# Patient Record
Sex: FEMALE | Race: WHITE | NOT HISPANIC OR LATINO | URBAN - METROPOLITAN AREA
[De-identification: names, ages, dates, MRNs, and addresses within clinical notes are randomized per-mention and may not be internally consistent; named-entity substitution may affect disease eponyms.]

---

## 2017-09-05 ENCOUNTER — EMERGENCY (EMERGENCY)
Facility: HOSPITAL | Age: 32
LOS: 1 days | Discharge: HOME | End: 2017-09-05
Attending: EMERGENCY MEDICINE | Admitting: EMERGENCY MEDICINE
Payer: COMMERCIAL

## 2017-09-05 VITALS
OXYGEN SATURATION: 100 % | SYSTOLIC BLOOD PRESSURE: 108 MMHG | HEART RATE: 76 BPM | RESPIRATION RATE: 18 BRPM | TEMPERATURE: 98 F | DIASTOLIC BLOOD PRESSURE: 73 MMHG | WEIGHT: 125 LBS | HEIGHT: 60 IN

## 2017-09-05 PROCEDURE — 99285 EMERGENCY DEPT VISIT HI MDM: CPT | Mod: 25

## 2017-09-06 VITALS
HEART RATE: 79 BPM | OXYGEN SATURATION: 99 % | RESPIRATION RATE: 18 BRPM | SYSTOLIC BLOOD PRESSURE: 91 MMHG | TEMPERATURE: 98 F | DIASTOLIC BLOOD PRESSURE: 58 MMHG

## 2017-09-06 LAB
ALBUMIN SERPL ELPH-MCNC: 4.1 G/DL — SIGNIFICANT CHANGE UP (ref 3.3–5)
ALP SERPL-CCNC: 52 U/L — SIGNIFICANT CHANGE UP (ref 40–120)
ALT FLD-CCNC: 13 U/L RC — SIGNIFICANT CHANGE UP (ref 10–45)
ANION GAP SERPL CALC-SCNC: 11 MMOL/L — SIGNIFICANT CHANGE UP (ref 5–17)
AST SERPL-CCNC: 15 U/L — SIGNIFICANT CHANGE UP (ref 10–40)
BASOPHILS # BLD AUTO: 0 K/UL — SIGNIFICANT CHANGE UP (ref 0–0.2)
BASOPHILS NFR BLD AUTO: 0.1 % — SIGNIFICANT CHANGE UP (ref 0–2)
BILIRUB SERPL-MCNC: 0.2 MG/DL — SIGNIFICANT CHANGE UP (ref 0.2–1.2)
BLD GP AB SCN SERPL QL: NEGATIVE — SIGNIFICANT CHANGE UP
BUN SERPL-MCNC: 11 MG/DL — SIGNIFICANT CHANGE UP (ref 7–23)
CALCIUM SERPL-MCNC: 9.3 MG/DL — SIGNIFICANT CHANGE UP (ref 8.4–10.5)
CHLORIDE SERPL-SCNC: 104 MMOL/L — SIGNIFICANT CHANGE UP (ref 96–108)
CO2 SERPL-SCNC: 24 MMOL/L — SIGNIFICANT CHANGE UP (ref 22–31)
CREAT SERPL-MCNC: 0.53 MG/DL — SIGNIFICANT CHANGE UP (ref 0.5–1.3)
EOSINOPHIL # BLD AUTO: 0 K/UL — SIGNIFICANT CHANGE UP (ref 0–0.5)
EOSINOPHIL NFR BLD AUTO: 0.3 % — SIGNIFICANT CHANGE UP (ref 0–6)
GLUCOSE SERPL-MCNC: 114 MG/DL — HIGH (ref 70–99)
HCG SERPL-ACNC: SIGNIFICANT CHANGE UP MIU/ML
HCT VFR BLD CALC: 35.9 % — SIGNIFICANT CHANGE UP (ref 34.5–45)
HGB BLD-MCNC: 12.4 G/DL — SIGNIFICANT CHANGE UP (ref 11.5–15.5)
LYMPHOCYTES # BLD AUTO: 1.5 K/UL — SIGNIFICANT CHANGE UP (ref 1–3.3)
LYMPHOCYTES # BLD AUTO: 18.9 % — SIGNIFICANT CHANGE UP (ref 13–44)
MCHC RBC-ENTMCNC: 30.5 PG — SIGNIFICANT CHANGE UP (ref 27–34)
MCHC RBC-ENTMCNC: 34.5 GM/DL — SIGNIFICANT CHANGE UP (ref 32–36)
MCV RBC AUTO: 88.4 FL — SIGNIFICANT CHANGE UP (ref 80–100)
MONOCYTES # BLD AUTO: 0.4 K/UL — SIGNIFICANT CHANGE UP (ref 0–0.9)
MONOCYTES NFR BLD AUTO: 5.3 % — SIGNIFICANT CHANGE UP (ref 2–14)
NEUTROPHILS # BLD AUTO: 6.1 K/UL — SIGNIFICANT CHANGE UP (ref 1.8–7.4)
NEUTROPHILS NFR BLD AUTO: 75.5 % — SIGNIFICANT CHANGE UP (ref 43–77)
PLATELET # BLD AUTO: 196 K/UL — SIGNIFICANT CHANGE UP (ref 150–400)
POTASSIUM SERPL-MCNC: 4 MMOL/L — SIGNIFICANT CHANGE UP (ref 3.5–5.3)
POTASSIUM SERPL-SCNC: 4 MMOL/L — SIGNIFICANT CHANGE UP (ref 3.5–5.3)
PROT SERPL-MCNC: 6.9 G/DL — SIGNIFICANT CHANGE UP (ref 6–8.3)
RBC # BLD: 4.05 M/UL — SIGNIFICANT CHANGE UP (ref 3.8–5.2)
RBC # FLD: 11 % — SIGNIFICANT CHANGE UP (ref 10.3–14.5)
RH IG SCN BLD-IMP: POSITIVE — SIGNIFICANT CHANGE UP
SODIUM SERPL-SCNC: 139 MMOL/L — SIGNIFICANT CHANGE UP (ref 135–145)
WBC # BLD: 8.1 K/UL — SIGNIFICANT CHANGE UP (ref 3.8–10.5)
WBC # FLD AUTO: 8.1 K/UL — SIGNIFICANT CHANGE UP (ref 3.8–10.5)

## 2017-09-06 PROCEDURE — 86850 RBC ANTIBODY SCREEN: CPT

## 2017-09-06 PROCEDURE — 76817 TRANSVAGINAL US OBSTETRIC: CPT

## 2017-09-06 PROCEDURE — 86900 BLOOD TYPING SEROLOGIC ABO: CPT

## 2017-09-06 PROCEDURE — 86901 BLOOD TYPING SEROLOGIC RH(D): CPT

## 2017-09-06 PROCEDURE — 84702 CHORIONIC GONADOTROPIN TEST: CPT

## 2017-09-06 PROCEDURE — 85027 COMPLETE CBC AUTOMATED: CPT

## 2017-09-06 PROCEDURE — 76817 TRANSVAGINAL US OBSTETRIC: CPT | Mod: 26

## 2017-09-06 PROCEDURE — 80053 COMPREHEN METABOLIC PANEL: CPT

## 2017-09-06 PROCEDURE — 99284 EMERGENCY DEPT VISIT MOD MDM: CPT | Mod: 25

## 2017-09-06 NOTE — ED PROVIDER NOTE - ATTENDING CONTRIBUTION TO CARE
Patient with + pregnancy test and no confirmed IUP. Patient with cramping and lower abdominal pain today. 1 hour duration. patient denies vaginal bleeding or discharge  gu by resident with no masses, no cmt, no pallor, soft, ntnd, no rebound/guarding, non-tachycardic, non-tachypneic, no pallor, normal conjunctiva  will get iv, labs, ultrasound abdomen, UA ordered but patient does not wish to wait for result and reassess  noted subchorionic hemorrhage but patient rH +   patient to see ob/gyn today and will give ua  No immediate life threatening issues present on history or clinical exam. Patient is a safe disposition home, has capacity and insight into their condition, ambulatory in the emergency department and will follow up with their doctor(s) this week. Patient and family  understand anticipatory guidance were given strict return and follow up precautions.  The patient and family have been informed of all concerning signs and symptoms to return to Emergency Department, the necessity to follow up with PMD/Clinic/follow up provided within 2-3 days was explained, and the patient and/or family reports understanding of above with capacity and insight.

## 2017-09-06 NOTE — ED PROVIDER NOTE - OBJECTIVE STATEMENT
32 F , LMP 17, pos pregnancy test at home, no confirmed IUP, p/w lower abd cramping today lasting about one hour. No vaginal bleeding or discharge. had one episode of diarrhea in ED and now cramping resolved. No fevers/chills. has appmt with gyn later today. No dysuria.

## 2017-09-06 NOTE — ED PROVIDER NOTE - CARE PLAN
Principal Discharge DX:	Pregnancy Principal Discharge DX:	Threatened   Secondary Diagnosis:	Subchorionic bleed

## 2017-09-06 NOTE — ED ADULT NURSE NOTE - OBJECTIVE STATEMENT
31 yo female presents to the ED from home, 6 weeks pregnant, her 4th pregnancy with 3 current live births, c/o bilateral lower abdominal pain 5/10 dull in nature with intermittent periods of sharp pain, non-radiating since 2300 last night. patient states she has been vomiting x3 days, last episode this AM and 1 episode of diarrhea tonight in the ED. patient states she started a new antinausea medication today. patient is AAOx4. lung sounds clear bilaterally. cap refill <3sec. patient abdomen is soft, tender to touch lower abdomen, non-distended. 31 yo female presents to the ED from home, 6 weeks pregnant, her 4th pregnancy with 3 current live births, c/o bilateral lower abdominal pain 5/10 dull in nature with intermittent periods of sharp pain, non-radiating since 2300 last night. patient states she has been vomiting x3 days, last episode this AM and 1 episode of diarrhea tonight in the ED. patient states she started a new antinausea medication today. patient is AAOx4. lung sounds clear bilaterally. cap refill <3sec. patient abdomen is soft, tender to touch lower abdomen, non-distended. patient denies vaginal spotting, fevers, chills, HA, dizziness, chest pain, SOB, cough. VSS. MD aware.

## 2018-03-14 ENCOUNTER — OUTPATIENT (OUTPATIENT)
Dept: OUTPATIENT SERVICES | Facility: HOSPITAL | Age: 33
LOS: 1 days | End: 2018-03-14
Payer: COMMERCIAL

## 2018-03-14 DIAGNOSIS — O26.899 OTHER SPECIFIED PREGNANCY RELATED CONDITIONS, UNSPECIFIED TRIMESTER: ICD-10-CM

## 2018-03-14 DIAGNOSIS — Z3A.00 WEEKS OF GESTATION OF PREGNANCY NOT SPECIFIED: ICD-10-CM

## 2018-03-14 PROCEDURE — G0463: CPT

## 2018-03-14 NOTE — CONSULT NOTE ADULT - SUBJECTIVE AND OBJECTIVE BOX
Dear Dr. Mireles    I had the pleasure of seeing Ms. Barbosa in consultation today.  As you know, she is a  at 33 weeks gestation.  EDC . She presents today with a chief complaint of paresthesia of BUE during previous epidural anesthesia in .   PSH of L5-S1 fusion in , since then pt had 2  with epidural anesthesia, first delivery was uneventful, however during second delivery, pt c/o BUE paresthesia during labor which resolved quickly after delivery.                                              .      Past Medical History: low back pain    Past Surgical History: L5-S1 fusion     Family History of Anesthetic Problems: denies    Medications: prenatal MVI, iron supplements    Allergies: NKDA    Physical Examination:  Height:   5feet        Weight:    162 pounds          Airway: Mallampati class II    Impression:    Dr. Cliff Obando evaluated pt and provided education in regard to anesthesia.   It was explained that given her back surgery there is a possibility that placement of the neuraxial block may be more challenging and that even successful placement may be associated with uneven analgesia given likely postsurgical scarring.  That being said, she is an excellent candidate for neuraxial anesthesia. The patient understands the risk of back pain postpartum regardless of the anesthetic chosen.  Risk factors for both general and regional anesthetics were discussed with patient.      Thank you for the courtesy of this consultation. The final anesthetic plan is as per the on call team. Dear Dr. Mireles    I had the pleasure of seeing Ms. Barbosa in consultation today.  As you know, she is a  at 33 weeks gestation.  EDC . She presents today with a chief complaint of paresthesia of BUE during previous epidural anesthesia in .   PSH of L5-S1 fusion in , since then pt had 2  with epidural anesthesia, first delivery was uneventful, however during second delivery, pt c/o BUE paresthesia during labor which resolved quickly after delivery.                                              .      Past Medical History: low back pain    Past Surgical History: L5-S1 fusion     Family History of Anesthetic Problems: denies    Medications: prenatal MVI, iron supplements    Allergies: NKDA    Physical Examination:  Height:   5feet        Weight:    162 pounds          Airway: Mallampati class II    Impression:    In summary, this patient has had a lower lumbar fusion in the past, but has had multiple deliveries with epidural analgesia since then without complication, except for some paresthesias in the arms. I discussed this issue with her at length and explained that this remains a risk for her but was likely an isolated incident secondary to high spread of the epidural infusion.  I explained that given her back surgery there is a possibility that placement of the neuraxial block may be more challenging and that even successful placement may be associated with uneven analgesia given likely postsurgical scarring.  That being said, she has had more than one successful and effective epidural and thus she is an excellent candidate for neuraxial anesthesia. The patient understands the risk of back pain postpartum regardless of the anesthetic chosen.  Risk factors for both general and regional anesthetics were discussed with patient.      Thank you for the courtesy of this consultation. The final anesthetic plan is as per the on call team.

## 2018-04-16 ENCOUNTER — OUTPATIENT (OUTPATIENT)
Dept: OUTPATIENT SERVICES | Facility: HOSPITAL | Age: 33
LOS: 1 days | End: 2018-04-16
Payer: COMMERCIAL

## 2018-04-16 DIAGNOSIS — O26.899 OTHER SPECIFIED PREGNANCY RELATED CONDITIONS, UNSPECIFIED TRIMESTER: ICD-10-CM

## 2018-04-16 DIAGNOSIS — Z3A.00 WEEKS OF GESTATION OF PREGNANCY NOT SPECIFIED: ICD-10-CM

## 2018-04-16 PROCEDURE — G0463: CPT

## 2018-04-16 PROCEDURE — 59025 FETAL NON-STRESS TEST: CPT

## 2018-04-18 ENCOUNTER — OUTPATIENT (OUTPATIENT)
Dept: OUTPATIENT SERVICES | Facility: HOSPITAL | Age: 33
LOS: 1 days | End: 2018-04-18
Payer: COMMERCIAL

## 2018-04-18 DIAGNOSIS — Z3A.00 WEEKS OF GESTATION OF PREGNANCY NOT SPECIFIED: ICD-10-CM

## 2018-04-18 DIAGNOSIS — O26.899 OTHER SPECIFIED PREGNANCY RELATED CONDITIONS, UNSPECIFIED TRIMESTER: ICD-10-CM

## 2018-04-18 PROCEDURE — 59025 FETAL NON-STRESS TEST: CPT

## 2018-04-18 PROCEDURE — 59025 FETAL NON-STRESS TEST: CPT | Mod: 26

## 2018-04-18 PROCEDURE — G0463: CPT

## 2018-04-24 ENCOUNTER — INPATIENT (INPATIENT)
Facility: HOSPITAL | Age: 33
LOS: 1 days | Discharge: HOME | End: 2018-04-26
Attending: OBSTETRICS & GYNECOLOGY | Admitting: OBSTETRICS & GYNECOLOGY
Payer: COMMERCIAL

## 2018-04-24 VITALS — HEIGHT: 60 IN | WEIGHT: 163.14 LBS

## 2018-04-24 DIAGNOSIS — Z33.1 PREGNANT STATE, INCIDENTAL: ICD-10-CM

## 2018-04-24 LAB
BASOPHILS # BLD AUTO: 0.1 K/UL — SIGNIFICANT CHANGE UP (ref 0–0.2)
BASOPHILS NFR BLD AUTO: 0.6 % — SIGNIFICANT CHANGE UP (ref 0–2)
BLD GP AB SCN SERPL QL: NEGATIVE — SIGNIFICANT CHANGE UP
EOSINOPHIL # BLD AUTO: 0 K/UL — SIGNIFICANT CHANGE UP (ref 0–0.5)
EOSINOPHIL NFR BLD AUTO: 0.4 % — SIGNIFICANT CHANGE UP (ref 0–6)
HCT VFR BLD CALC: 35.2 % — SIGNIFICANT CHANGE UP (ref 34.5–45)
HGB BLD-MCNC: 12.1 G/DL — SIGNIFICANT CHANGE UP (ref 11.5–15.5)
LYMPHOCYTES # BLD AUTO: 2.5 K/UL — SIGNIFICANT CHANGE UP (ref 1–3.3)
LYMPHOCYTES # BLD AUTO: 28.2 % — SIGNIFICANT CHANGE UP (ref 13–44)
MCHC RBC-ENTMCNC: 29.8 PG — SIGNIFICANT CHANGE UP (ref 27–34)
MCHC RBC-ENTMCNC: 34.5 GM/DL — SIGNIFICANT CHANGE UP (ref 32–36)
MCV RBC AUTO: 86.2 FL — SIGNIFICANT CHANGE UP (ref 80–100)
MONOCYTES # BLD AUTO: 0.6 K/UL — SIGNIFICANT CHANGE UP (ref 0–0.9)
MONOCYTES NFR BLD AUTO: 7.4 % — SIGNIFICANT CHANGE UP (ref 2–14)
NEUTROPHILS # BLD AUTO: 5.5 K/UL — SIGNIFICANT CHANGE UP (ref 1.8–7.4)
NEUTROPHILS NFR BLD AUTO: 63.4 % — SIGNIFICANT CHANGE UP (ref 43–77)
PLATELET # BLD AUTO: 220 K/UL — SIGNIFICANT CHANGE UP (ref 150–400)
RBC # BLD: 4.08 M/UL — SIGNIFICANT CHANGE UP (ref 3.8–5.2)
RBC # FLD: 15 % — HIGH (ref 10.3–14.5)
RH IG SCN BLD-IMP: POSITIVE — SIGNIFICANT CHANGE UP
T PALLIDUM AB TITR SER: NEGATIVE — SIGNIFICANT CHANGE UP
WBC # BLD: 8.7 K/UL — SIGNIFICANT CHANGE UP (ref 3.8–10.5)
WBC # FLD AUTO: 8.7 K/UL — SIGNIFICANT CHANGE UP (ref 3.8–10.5)

## 2018-04-24 RX ORDER — IBUPROFEN 200 MG
600 TABLET ORAL EVERY 6 HOURS
Qty: 0 | Refills: 0 | Status: COMPLETED | OUTPATIENT
Start: 2018-04-24 | End: 2019-03-23

## 2018-04-24 RX ORDER — CITRIC ACID/SODIUM CITRATE 300-500 MG
15 SOLUTION, ORAL ORAL EVERY 4 HOURS
Qty: 0 | Refills: 0 | Status: DISCONTINUED | OUTPATIENT
Start: 2018-04-24 | End: 2018-04-24

## 2018-04-24 RX ORDER — OXYCODONE HYDROCHLORIDE 5 MG/1
5 TABLET ORAL
Qty: 0 | Refills: 0 | Status: DISCONTINUED | OUTPATIENT
Start: 2018-04-24 | End: 2018-04-26

## 2018-04-24 RX ORDER — LANOLIN
1 OINTMENT (GRAM) TOPICAL EVERY 6 HOURS
Qty: 0 | Refills: 0 | Status: DISCONTINUED | OUTPATIENT
Start: 2018-04-24 | End: 2018-04-26

## 2018-04-24 RX ORDER — SODIUM CHLORIDE 9 MG/ML
1000 INJECTION, SOLUTION INTRAVENOUS
Qty: 0 | Refills: 0 | Status: DISCONTINUED | OUTPATIENT
Start: 2018-04-24 | End: 2018-04-24

## 2018-04-24 RX ORDER — HYDROCORTISONE 1 %
1 OINTMENT (GRAM) TOPICAL EVERY 4 HOURS
Qty: 0 | Refills: 0 | Status: DISCONTINUED | OUTPATIENT
Start: 2018-04-24 | End: 2018-04-26

## 2018-04-24 RX ORDER — KETOROLAC TROMETHAMINE 30 MG/ML
30 SYRINGE (ML) INJECTION ONCE
Qty: 0 | Refills: 0 | Status: DISCONTINUED | OUTPATIENT
Start: 2018-04-24 | End: 2018-04-24

## 2018-04-24 RX ORDER — OXYCODONE HYDROCHLORIDE 5 MG/1
5 TABLET ORAL EVERY 4 HOURS
Qty: 0 | Refills: 0 | Status: DISCONTINUED | OUTPATIENT
Start: 2018-04-24 | End: 2018-04-26

## 2018-04-24 RX ORDER — SIMETHICONE 80 MG/1
80 TABLET, CHEWABLE ORAL EVERY 6 HOURS
Qty: 0 | Refills: 0 | Status: DISCONTINUED | OUTPATIENT
Start: 2018-04-24 | End: 2018-04-26

## 2018-04-24 RX ORDER — AER TRAVELER 0.5 G/1
1 SOLUTION RECTAL; TOPICAL EVERY 4 HOURS
Qty: 0 | Refills: 0 | Status: DISCONTINUED | OUTPATIENT
Start: 2018-04-24 | End: 2018-04-24

## 2018-04-24 RX ORDER — OXYTOCIN 10 UNIT/ML
41.67 VIAL (ML) INJECTION
Qty: 20 | Refills: 0 | Status: DISCONTINUED | OUTPATIENT
Start: 2018-04-24 | End: 2018-04-24

## 2018-04-24 RX ORDER — PRAMOXINE HYDROCHLORIDE 150 MG/15G
1 AEROSOL, FOAM RECTAL EVERY 4 HOURS
Qty: 0 | Refills: 0 | Status: DISCONTINUED | OUTPATIENT
Start: 2018-04-24 | End: 2018-04-26

## 2018-04-24 RX ORDER — DIBUCAINE 1 %
1 OINTMENT (GRAM) RECTAL EVERY 4 HOURS
Qty: 0 | Refills: 0 | Status: DISCONTINUED | OUTPATIENT
Start: 2018-04-24 | End: 2018-04-26

## 2018-04-24 RX ORDER — DOCUSATE SODIUM 100 MG
100 CAPSULE ORAL
Qty: 0 | Refills: 0 | Status: DISCONTINUED | OUTPATIENT
Start: 2018-04-24 | End: 2018-04-26

## 2018-04-24 RX ORDER — OXYTOCIN 10 UNIT/ML
41.67 VIAL (ML) INJECTION
Qty: 20 | Refills: 0 | Status: DISCONTINUED | OUTPATIENT
Start: 2018-04-24 | End: 2018-04-26

## 2018-04-24 RX ORDER — OXYTOCIN 10 UNIT/ML
4 VIAL (ML) INJECTION
Qty: 30 | Refills: 0 | Status: DISCONTINUED | OUTPATIENT
Start: 2018-04-24 | End: 2018-04-24

## 2018-04-24 RX ORDER — PRAMOXINE HYDROCHLORIDE 150 MG/15G
1 AEROSOL, FOAM RECTAL EVERY 4 HOURS
Qty: 0 | Refills: 0 | Status: DISCONTINUED | OUTPATIENT
Start: 2018-04-24 | End: 2018-04-24

## 2018-04-24 RX ORDER — ACETAMINOPHEN 500 MG
975 TABLET ORAL EVERY 6 HOURS
Qty: 0 | Refills: 0 | Status: DISCONTINUED | OUTPATIENT
Start: 2018-04-24 | End: 2018-04-26

## 2018-04-24 RX ORDER — TETANUS TOXOID, REDUCED DIPHTHERIA TOXOID AND ACELLULAR PERTUSSIS VACCINE, ADSORBED 5; 2.5; 8; 8; 2.5 [IU]/.5ML; [IU]/.5ML; UG/.5ML; UG/.5ML; UG/.5ML
0.5 SUSPENSION INTRAMUSCULAR ONCE
Qty: 0 | Refills: 0 | Status: DISCONTINUED | OUTPATIENT
Start: 2018-04-24 | End: 2018-04-26

## 2018-04-24 RX ORDER — OXYTOCIN 10 UNIT/ML
333.33 VIAL (ML) INJECTION
Qty: 20 | Refills: 0 | Status: DISCONTINUED | OUTPATIENT
Start: 2018-04-24 | End: 2018-04-26

## 2018-04-24 RX ORDER — SODIUM CHLORIDE 9 MG/ML
3 INJECTION INTRAMUSCULAR; INTRAVENOUS; SUBCUTANEOUS EVERY 8 HOURS
Qty: 0 | Refills: 0 | Status: DISCONTINUED | OUTPATIENT
Start: 2018-04-24 | End: 2018-04-26

## 2018-04-24 RX ORDER — SODIUM CHLORIDE 9 MG/ML
1000 INJECTION, SOLUTION INTRAVENOUS ONCE
Qty: 0 | Refills: 0 | Status: COMPLETED | OUTPATIENT
Start: 2018-04-24 | End: 2018-04-24

## 2018-04-24 RX ORDER — DIBUCAINE 1 %
1 OINTMENT (GRAM) RECTAL EVERY 4 HOURS
Qty: 0 | Refills: 0 | Status: DISCONTINUED | OUTPATIENT
Start: 2018-04-24 | End: 2018-04-24

## 2018-04-24 RX ORDER — DIPHENHYDRAMINE HCL 50 MG
25 CAPSULE ORAL EVERY 6 HOURS
Qty: 0 | Refills: 0 | Status: DISCONTINUED | OUTPATIENT
Start: 2018-04-24 | End: 2018-04-26

## 2018-04-24 RX ORDER — HYDROCORTISONE 1 %
1 OINTMENT (GRAM) TOPICAL EVERY 4 HOURS
Qty: 0 | Refills: 0 | Status: DISCONTINUED | OUTPATIENT
Start: 2018-04-24 | End: 2018-04-24

## 2018-04-24 RX ORDER — GLYCERIN ADULT
1 SUPPOSITORY, RECTAL RECTAL AT BEDTIME
Qty: 0 | Refills: 0 | Status: DISCONTINUED | OUTPATIENT
Start: 2018-04-24 | End: 2018-04-26

## 2018-04-24 RX ORDER — AER TRAVELER 0.5 G/1
1 SOLUTION RECTAL; TOPICAL EVERY 4 HOURS
Qty: 0 | Refills: 0 | Status: DISCONTINUED | OUTPATIENT
Start: 2018-04-24 | End: 2018-04-26

## 2018-04-24 RX ORDER — IBUPROFEN 200 MG
600 TABLET ORAL EVERY 6 HOURS
Qty: 0 | Refills: 0 | Status: DISCONTINUED | OUTPATIENT
Start: 2018-04-24 | End: 2018-04-26

## 2018-04-24 RX ORDER — SODIUM CHLORIDE 9 MG/ML
3 INJECTION INTRAMUSCULAR; INTRAVENOUS; SUBCUTANEOUS EVERY 8 HOURS
Qty: 0 | Refills: 0 | Status: DISCONTINUED | OUTPATIENT
Start: 2018-04-24 | End: 2018-04-24

## 2018-04-24 RX ORDER — MAGNESIUM HYDROXIDE 400 MG/1
30 TABLET, CHEWABLE ORAL
Qty: 0 | Refills: 0 | Status: DISCONTINUED | OUTPATIENT
Start: 2018-04-24 | End: 2018-04-26

## 2018-04-24 RX ORDER — ACETAMINOPHEN 500 MG
975 TABLET ORAL EVERY 6 HOURS
Qty: 0 | Refills: 0 | Status: COMPLETED | OUTPATIENT
Start: 2018-04-24 | End: 2019-03-23

## 2018-04-24 RX ORDER — OXYTOCIN 10 UNIT/ML
333.33 VIAL (ML) INJECTION
Qty: 20 | Refills: 0 | Status: COMPLETED | OUTPATIENT
Start: 2018-04-24

## 2018-04-24 RX ADMIN — Medication 30 MILLIGRAM(S): at 14:28

## 2018-04-24 RX ADMIN — Medication 4 MILLIUNIT(S)/MIN: at 09:37

## 2018-04-24 RX ADMIN — SODIUM CHLORIDE 2000 MILLILITER(S): 9 INJECTION, SOLUTION INTRAVENOUS at 05:50

## 2018-04-24 RX ADMIN — Medication 1000 MILLIUNIT(S)/MIN: at 14:12

## 2018-04-24 RX ADMIN — Medication 600 MILLIGRAM(S): at 21:10

## 2018-04-24 RX ADMIN — Medication 600 MILLIGRAM(S): at 20:41

## 2018-04-24 RX ADMIN — Medication 100 MILLIGRAM(S): at 20:42

## 2018-04-24 RX ADMIN — Medication 4 MILLIUNIT(S)/MIN: at 06:21

## 2018-04-24 RX ADMIN — SODIUM CHLORIDE 250 MILLILITER(S): 9 INJECTION, SOLUTION INTRAVENOUS at 06:21

## 2018-04-25 LAB
HCT VFR BLD CALC: 31.4 % — LOW (ref 34.5–45)
HGB BLD-MCNC: 9.9 G/DL — LOW (ref 11.5–15.5)

## 2018-04-25 RX ADMIN — Medication 975 MILLIGRAM(S): at 15:33

## 2018-04-25 RX ADMIN — Medication 600 MILLIGRAM(S): at 13:40

## 2018-04-25 RX ADMIN — Medication 600 MILLIGRAM(S): at 18:40

## 2018-04-25 RX ADMIN — Medication 975 MILLIGRAM(S): at 16:11

## 2018-04-25 RX ADMIN — Medication 975 MILLIGRAM(S): at 21:58

## 2018-04-25 RX ADMIN — Medication 600 MILLIGRAM(S): at 03:45

## 2018-04-25 RX ADMIN — Medication 975 MILLIGRAM(S): at 21:06

## 2018-04-25 RX ADMIN — Medication 975 MILLIGRAM(S): at 09:30

## 2018-04-25 RX ADMIN — Medication 975 MILLIGRAM(S): at 08:54

## 2018-04-25 RX ADMIN — Medication 600 MILLIGRAM(S): at 13:10

## 2018-04-25 RX ADMIN — Medication 1 TABLET(S): at 13:10

## 2018-04-25 RX ADMIN — Medication 600 MILLIGRAM(S): at 03:13

## 2018-04-25 NOTE — PROGRESS NOTE ADULT - PROBLEM SELECTOR PLAN 1
Increase OOB  Regular diet  PO Pain protocol  AM H&H  Routine Postpartum Care  Warm packs to epidural site, Motrin

## 2018-04-25 NOTE — PROGRESS NOTE ADULT - SUBJECTIVE AND OBJECTIVE BOX
PA Postpartum Note- PPD#1    Prenatal Labs  Blood type: A Positive  Rubella IgG: IMMune  RPR: Negative      S:Patient w/o complaints, pain is controlled.  Pt c/o tenderness at epidural site. Pt states she has a h/o back surgery and required multiple attempts to place epidural.  Pt is OOB, tolerating PO, voiding. Lochia WNL.     O:  Vital Signs Last 24 Hrs  T(C): 36.8 (25 Apr 2018 05:00), Max: 37 (24 Apr 2018 13:45)  T(F): 98.3 (25 Apr 2018 05:00), Max: 98.6 (24 Apr 2018 13:45)  HR: 80 (25 Apr 2018 05:00) (62 - 98)  BP: 93/61 (25 Apr 2018 05:00) (90/53 - 108/-)  RR: 18 (25 Apr 2018 05:00) (16 - 18)  SpO2: 98% (24 Apr 2018 17:00) (96% - 100%)     Gen: NAD  Abdomen: Soft, nontender, non-distended, fundus firm.  Back: mild ecchymosis at epidural site. Neg warmth, Neg edema Neg errythema  Vaginal: Lochia WNL,    Ext: Neg edema, Neg calf tenderness    LABS:    Hemoglobin: 12.1 g/dL (04-24 @ 06:06)      Hematocrit: 35.2 % (04-24 @ 06:06)      PMHx: s/p spinal fusion, MVP  Current Issues:  tenderness at epidural site

## 2018-04-26 ENCOUNTER — TRANSCRIPTION ENCOUNTER (OUTPATIENT)
Age: 33
End: 2018-04-26

## 2018-04-26 VITALS
SYSTOLIC BLOOD PRESSURE: 93 MMHG | DIASTOLIC BLOOD PRESSURE: 63 MMHG | HEART RATE: 70 BPM | TEMPERATURE: 98 F | RESPIRATION RATE: 18 BRPM

## 2018-04-26 PROCEDURE — 86780 TREPONEMA PALLIDUM: CPT

## 2018-04-26 PROCEDURE — 86901 BLOOD TYPING SEROLOGIC RH(D): CPT

## 2018-04-26 PROCEDURE — 86850 RBC ANTIBODY SCREEN: CPT

## 2018-04-26 PROCEDURE — 85018 HEMOGLOBIN: CPT

## 2018-04-26 PROCEDURE — 59025 FETAL NON-STRESS TEST: CPT

## 2018-04-26 PROCEDURE — 59050 FETAL MONITOR W/REPORT: CPT

## 2018-04-26 PROCEDURE — 85014 HEMATOCRIT: CPT

## 2018-04-26 PROCEDURE — 86900 BLOOD TYPING SEROLOGIC ABO: CPT

## 2018-04-26 PROCEDURE — 85027 COMPLETE CBC AUTOMATED: CPT

## 2018-04-26 RX ADMIN — Medication 975 MILLIGRAM(S): at 08:00

## 2018-04-26 RX ADMIN — Medication 600 MILLIGRAM(S): at 02:21

## 2018-04-26 RX ADMIN — Medication 1 TABLET(S): at 10:21

## 2018-04-26 RX ADMIN — Medication 600 MILLIGRAM(S): at 11:00

## 2018-04-26 RX ADMIN — Medication 600 MILLIGRAM(S): at 03:15

## 2018-04-26 RX ADMIN — Medication 600 MILLIGRAM(S): at 10:21

## 2018-04-26 RX ADMIN — Medication 975 MILLIGRAM(S): at 08:45

## 2018-04-26 NOTE — PROGRESS NOTE ADULT - SUBJECTIVE AND OBJECTIVE BOX
S: Patient doing well. No complaints. Minimal lochia. Pain controlled.    O: Vital Signs Last 24 Hrs  T(C): 36.9 (2018 05:00), Max: 36.9 (2018 05:00)  T(F): 98.4 (2018 05:00), Max: 98.4 (2018 05:00)  HR: 70 (2018 05:00) (70 - 72)  BP: 93/63 (2018 05:00) (93/63 - 109/74)  BP(mean): --  RR: 18 (2018 05:00) (18 - 18)  SpO2: --    Gen: NAD  Abd: soft, Nontender, Nondistended, fundus firm  Ext: no tendern, mild edema    Labs:                        9.9    x     )-----------( x        ( 2018 07:44 )             31.4       A: 32y PPD#2 s/p  doing well.    Plan:  Routine postpartum care  Encouraged out of bed  Regular diet    Discharge  ADY Mireles MD

## 2018-04-26 NOTE — DISCHARGE NOTE OB - PATIENT PORTAL LINK FT
You can access the [facility] Patient Portal, offered by [facility], by registering with the following website: [email or web address]

## 2018-04-26 NOTE — DISCHARGE NOTE OB - CARE PROVIDER_API CALL
Tavo Mireles), Obstetrics and Gynecology  [address] [place] [id number]  Phone: [phone]  Fax: [phone]

## 2019-03-27 ENCOUNTER — APPOINTMENT (OUTPATIENT)
Dept: OPHTHALMOLOGY | Facility: CLINIC | Age: 34
End: 2019-03-27
Payer: COMMERCIAL

## 2019-03-27 DIAGNOSIS — H49.21 SIXTH [ABDUCENT] NERVE PALSY, RIGHT EYE: ICD-10-CM

## 2019-03-27 PROCEDURE — 99204 OFFICE O/P NEW MOD 45 MIN: CPT

## 2019-03-27 PROCEDURE — 92060 SENSORIMOTOR EXAMINATION: CPT

## 2019-04-01 ENCOUNTER — APPOINTMENT (OUTPATIENT)
Dept: RADIOLOGY | Facility: HOSPITAL | Age: 34
End: 2019-04-01
Payer: COMMERCIAL

## 2019-04-01 ENCOUNTER — OUTPATIENT (OUTPATIENT)
Dept: OUTPATIENT SERVICES | Facility: HOSPITAL | Age: 34
LOS: 1 days | End: 2019-04-01
Payer: COMMERCIAL

## 2019-04-01 DIAGNOSIS — H53.2 DIPLOPIA: ICD-10-CM

## 2019-04-01 LAB
APPEARANCE CSF: CLEAR — SIGNIFICANT CHANGE UP
COLOR CSF: SIGNIFICANT CHANGE UP
GLUCOSE CSF-MCNC: 65 MG/DL — SIGNIFICANT CHANGE UP (ref 40–70)
NEUTROPHILS # CSF: SIGNIFICANT CHANGE UP (ref 0–6)
NRBC NFR CSF: <1 — SIGNIFICANT CHANGE UP (ref 0–5)
PROT CSF-MCNC: 19 MG/DL — SIGNIFICANT CHANGE UP (ref 15–45)
RBC # CSF: 1 /UL — HIGH (ref 0–0)
TUBE TYPE: SIGNIFICANT CHANGE UP

## 2019-04-01 PROCEDURE — 87252 VIRUS INOCULATION TISSUE: CPT

## 2019-04-01 PROCEDURE — 62270 DX LMBR SPI PNXR: CPT

## 2019-04-01 PROCEDURE — 84157 ASSAY OF PROTEIN OTHER: CPT

## 2019-04-01 PROCEDURE — 82945 GLUCOSE OTHER FLUID: CPT

## 2019-04-01 PROCEDURE — 77003 FLUOROGUIDE FOR SPINE INJECT: CPT

## 2019-04-01 PROCEDURE — 89051 BODY FLUID CELL COUNT: CPT

## 2019-04-01 PROCEDURE — 86617 LYME DISEASE ANTIBODY: CPT

## 2019-04-01 PROCEDURE — 77003 FLUOROGUIDE FOR SPINE INJECT: CPT | Mod: 26

## 2019-04-05 ENCOUNTER — OUTPATIENT (OUTPATIENT)
Dept: OUTPATIENT SERVICES | Facility: HOSPITAL | Age: 34
LOS: 1 days | End: 2019-04-05
Payer: COMMERCIAL

## 2019-04-05 DIAGNOSIS — Z00.00 ENCOUNTER FOR GENERAL ADULT MEDICAL EXAMINATION WITHOUT ABNORMAL FINDINGS: ICD-10-CM

## 2019-04-05 PROCEDURE — 77003 FLUOROGUIDE FOR SPINE INJECT: CPT | Mod: 26

## 2019-04-05 PROCEDURE — 62273 INJECT EPIDURAL PATCH: CPT

## 2019-04-05 PROCEDURE — 77002 NEEDLE LOCALIZATION BY XRAY: CPT

## 2019-04-05 PROCEDURE — 77003 FLUOROGUIDE FOR SPINE INJECT: CPT

## 2019-04-10 LAB — B BURGDOR AB CSF-ACNC: SIGNIFICANT CHANGE UP

## 2019-04-29 ENCOUNTER — APPOINTMENT (OUTPATIENT)
Dept: OPHTHALMOLOGY | Facility: CLINIC | Age: 34
End: 2019-04-29

## 2020-03-19 ENCOUNTER — TRANSCRIPTION ENCOUNTER (OUTPATIENT)
Age: 35
End: 2020-03-19

## 2022-03-03 ENCOUNTER — APPOINTMENT (OUTPATIENT)
Dept: PAIN MANAGEMENT | Facility: CLINIC | Age: 37
End: 2022-03-03

## 2023-07-18 ENCOUNTER — APPOINTMENT (OUTPATIENT)
Dept: OBGYN | Facility: CLINIC | Age: 38
End: 2023-07-18
Payer: COMMERCIAL

## 2023-07-18 PROCEDURE — 58301 REMOVE INTRAUTERINE DEVICE: CPT

## 2023-09-07 NOTE — PATIENT PROFILE OB - PRO MENTAL HEALTH SX RECENT
Quality 226: Preventive Care And Screening: Tobacco Use: Screening And Cessation Intervention: Patient screened for tobacco use and is an ex/non-smoker Quality 110: Preventive Care And Screening: Influenza Immunization: Influenza Immunization Administered during Influenza season Quality 431: Preventive Care And Screening: Unhealthy Alcohol Use - Screening: Patient not identified as an unhealthy alcohol user when screened for unhealthy alcohol use using a systematic screening method Detail Level: Detailed none

## 2023-09-29 ENCOUNTER — APPOINTMENT (OUTPATIENT)
Dept: OBGYN | Facility: CLINIC | Age: 38
End: 2023-09-29

## 2023-10-06 ENCOUNTER — TRANSCRIPTION ENCOUNTER (OUTPATIENT)
Age: 38
End: 2023-10-06

## 2023-10-06 ENCOUNTER — APPOINTMENT (OUTPATIENT)
Dept: OBGYN | Facility: CLINIC | Age: 38
End: 2023-10-06
Payer: COMMERCIAL

## 2023-10-06 PROCEDURE — 36415 COLL VENOUS BLD VENIPUNCTURE: CPT

## 2023-10-15 ENCOUNTER — TRANSCRIPTION ENCOUNTER (OUTPATIENT)
Age: 38
End: 2023-10-15

## 2023-10-16 ENCOUNTER — TRANSCRIPTION ENCOUNTER (OUTPATIENT)
Age: 38
End: 2023-10-16

## 2023-10-16 ENCOUNTER — APPOINTMENT (OUTPATIENT)
Dept: OBGYN | Facility: CLINIC | Age: 38
End: 2023-10-16
Payer: COMMERCIAL

## 2023-10-16 ENCOUNTER — INPATIENT (INPATIENT)
Facility: HOSPITAL | Age: 38
LOS: 0 days | Discharge: HOME | DRG: 819 | End: 2023-10-17
Attending: OBSTETRICS & GYNECOLOGY | Admitting: OBSTETRICS & GYNECOLOGY
Payer: COMMERCIAL

## 2023-10-16 VITALS
OXYGEN SATURATION: 99 % | WEIGHT: 149.91 LBS | TEMPERATURE: 99 F | SYSTOLIC BLOOD PRESSURE: 99 MMHG | RESPIRATION RATE: 16 BRPM | HEART RATE: 79 BPM | DIASTOLIC BLOOD PRESSURE: 68 MMHG

## 2023-10-16 DIAGNOSIS — O00.90 UNSPECIFIED ECTOPIC PREGNANCY WITHOUT INTRAUTERINE PREGNANCY: ICD-10-CM

## 2023-10-16 LAB
ALBUMIN SERPL ELPH-MCNC: 4.3 G/DL — SIGNIFICANT CHANGE UP (ref 3.3–5)
ALP SERPL-CCNC: 57 U/L — SIGNIFICANT CHANGE UP (ref 40–120)
ALT FLD-CCNC: 19 U/L — SIGNIFICANT CHANGE UP (ref 10–45)
ANION GAP SERPL CALC-SCNC: 9 MMOL/L — SIGNIFICANT CHANGE UP (ref 5–17)
APPEARANCE UR: CLEAR — SIGNIFICANT CHANGE UP
APPEARANCE UR: CLEAR — SIGNIFICANT CHANGE UP
APTT BLD: 29.2 SEC — SIGNIFICANT CHANGE UP (ref 24.5–35.6)
AST SERPL-CCNC: 16 U/L — SIGNIFICANT CHANGE UP (ref 10–40)
BACTERIA # UR AUTO: NEGATIVE — SIGNIFICANT CHANGE UP
BACTERIA # UR AUTO: NEGATIVE — SIGNIFICANT CHANGE UP
BASOPHILS # BLD AUTO: 0.03 K/UL — SIGNIFICANT CHANGE UP (ref 0–0.2)
BASOPHILS NFR BLD AUTO: 0.4 % — SIGNIFICANT CHANGE UP (ref 0–2)
BILIRUB SERPL-MCNC: 0.4 MG/DL — SIGNIFICANT CHANGE UP (ref 0.2–1.2)
BILIRUB UR-MCNC: NEGATIVE — SIGNIFICANT CHANGE UP
BILIRUB UR-MCNC: NEGATIVE — SIGNIFICANT CHANGE UP
BUN SERPL-MCNC: 10 MG/DL — SIGNIFICANT CHANGE UP (ref 7–23)
CALCIUM SERPL-MCNC: 9.4 MG/DL — SIGNIFICANT CHANGE UP (ref 8.4–10.5)
CHLORIDE SERPL-SCNC: 105 MMOL/L — SIGNIFICANT CHANGE UP (ref 96–108)
CO2 SERPL-SCNC: 25 MMOL/L — SIGNIFICANT CHANGE UP (ref 22–31)
COLOR SPEC: SIGNIFICANT CHANGE UP
COLOR SPEC: SIGNIFICANT CHANGE UP
CREAT SERPL-MCNC: 0.59 MG/DL — SIGNIFICANT CHANGE UP (ref 0.5–1.3)
DIFF PNL FLD: ABNORMAL
DIFF PNL FLD: ABNORMAL
EGFR: 118 ML/MIN/1.73M2 — SIGNIFICANT CHANGE UP
EOSINOPHIL # BLD AUTO: 0.01 K/UL — SIGNIFICANT CHANGE UP (ref 0–0.5)
EOSINOPHIL NFR BLD AUTO: 0.1 % — SIGNIFICANT CHANGE UP (ref 0–6)
EPI CELLS # UR: 2 /HPF — SIGNIFICANT CHANGE UP
EPI CELLS # UR: 2 /HPF — SIGNIFICANT CHANGE UP
GLUCOSE SERPL-MCNC: 93 MG/DL — SIGNIFICANT CHANGE UP (ref 70–99)
GLUCOSE UR QL: NEGATIVE — SIGNIFICANT CHANGE UP
GLUCOSE UR QL: NEGATIVE — SIGNIFICANT CHANGE UP
HCG SERPL-ACNC: 359 MIU/ML — HIGH
HCT VFR BLD CALC: 39.6 % — SIGNIFICANT CHANGE UP (ref 34.5–45)
HGB BLD-MCNC: 13.2 G/DL — SIGNIFICANT CHANGE UP (ref 11.5–15.5)
HYALINE CASTS # UR AUTO: 1 /LPF — SIGNIFICANT CHANGE UP (ref 0–2)
HYALINE CASTS # UR AUTO: 1 /LPF — SIGNIFICANT CHANGE UP (ref 0–2)
IMM GRANULOCYTES NFR BLD AUTO: 0.4 % — SIGNIFICANT CHANGE UP (ref 0–0.9)
INR BLD: 1.07 RATIO — SIGNIFICANT CHANGE UP (ref 0.85–1.18)
KETONES UR-MCNC: NEGATIVE — SIGNIFICANT CHANGE UP
KETONES UR-MCNC: NEGATIVE — SIGNIFICANT CHANGE UP
LEUKOCYTE ESTERASE UR-ACNC: NEGATIVE — SIGNIFICANT CHANGE UP
LEUKOCYTE ESTERASE UR-ACNC: NEGATIVE — SIGNIFICANT CHANGE UP
LYMPHOCYTES # BLD AUTO: 1.94 K/UL — SIGNIFICANT CHANGE UP (ref 1–3.3)
LYMPHOCYTES # BLD AUTO: 25.3 % — SIGNIFICANT CHANGE UP (ref 13–44)
MAGNESIUM SERPL-MCNC: 2 MG/DL — SIGNIFICANT CHANGE UP (ref 1.6–2.6)
MCHC RBC-ENTMCNC: 29.4 PG — SIGNIFICANT CHANGE UP (ref 27–34)
MCHC RBC-ENTMCNC: 33.3 GM/DL — SIGNIFICANT CHANGE UP (ref 32–36)
MCV RBC AUTO: 88.2 FL — SIGNIFICANT CHANGE UP (ref 80–100)
MONOCYTES # BLD AUTO: 0.42 K/UL — SIGNIFICANT CHANGE UP (ref 0–0.9)
MONOCYTES NFR BLD AUTO: 5.5 % — SIGNIFICANT CHANGE UP (ref 2–14)
NEUTROPHILS # BLD AUTO: 5.24 K/UL — SIGNIFICANT CHANGE UP (ref 1.8–7.4)
NEUTROPHILS NFR BLD AUTO: 68.3 % — SIGNIFICANT CHANGE UP (ref 43–77)
NITRITE UR-MCNC: NEGATIVE — SIGNIFICANT CHANGE UP
NITRITE UR-MCNC: NEGATIVE — SIGNIFICANT CHANGE UP
NRBC # BLD: 0 /100 WBCS — SIGNIFICANT CHANGE UP (ref 0–0)
PH UR: 8 — SIGNIFICANT CHANGE UP (ref 5–8)
PH UR: 8 — SIGNIFICANT CHANGE UP (ref 5–8)
PHOSPHATE SERPL-MCNC: 3.9 MG/DL — SIGNIFICANT CHANGE UP (ref 2.5–4.5)
PLATELET # BLD AUTO: 256 K/UL — SIGNIFICANT CHANGE UP (ref 150–400)
POTASSIUM SERPL-MCNC: 4.1 MMOL/L — SIGNIFICANT CHANGE UP (ref 3.5–5.3)
POTASSIUM SERPL-SCNC: 4.1 MMOL/L — SIGNIFICANT CHANGE UP (ref 3.5–5.3)
PROT SERPL-MCNC: 6.7 G/DL — SIGNIFICANT CHANGE UP (ref 6–8.3)
PROT UR-MCNC: SIGNIFICANT CHANGE UP
PROT UR-MCNC: SIGNIFICANT CHANGE UP
PROTHROM AB SERPL-ACNC: 11.2 SEC — SIGNIFICANT CHANGE UP (ref 9.5–13)
RBC # BLD: 4.49 M/UL — SIGNIFICANT CHANGE UP (ref 3.8–5.2)
RBC # FLD: 12.1 % — SIGNIFICANT CHANGE UP (ref 10.3–14.5)
RBC CASTS # UR COMP ASSIST: 1 /HPF — SIGNIFICANT CHANGE UP (ref 0–4)
RBC CASTS # UR COMP ASSIST: 1 /HPF — SIGNIFICANT CHANGE UP (ref 0–4)
SODIUM SERPL-SCNC: 139 MMOL/L — SIGNIFICANT CHANGE UP (ref 135–145)
SP GR SPEC: 1.02 — SIGNIFICANT CHANGE UP (ref 1.01–1.02)
SP GR SPEC: 1.02 — SIGNIFICANT CHANGE UP (ref 1.01–1.02)
UROBILINOGEN FLD QL: NEGATIVE — SIGNIFICANT CHANGE UP
UROBILINOGEN FLD QL: NEGATIVE — SIGNIFICANT CHANGE UP
WBC # BLD: 7.67 K/UL — SIGNIFICANT CHANGE UP (ref 3.8–10.5)
WBC # FLD AUTO: 7.67 K/UL — SIGNIFICANT CHANGE UP (ref 3.8–10.5)
WBC UR QL: 1 /HPF — SIGNIFICANT CHANGE UP (ref 0–5)
WBC UR QL: 1 /HPF — SIGNIFICANT CHANGE UP (ref 0–5)

## 2023-10-16 PROCEDURE — 76817 TRANSVAGINAL US OBSTETRIC: CPT | Mod: 26

## 2023-10-16 PROCEDURE — 85610 PROTHROMBIN TIME: CPT

## 2023-10-16 PROCEDURE — 83735 ASSAY OF MAGNESIUM: CPT

## 2023-10-16 PROCEDURE — 88342 IMHCHEM/IMCYTCHM 1ST ANTB: CPT | Mod: 26

## 2023-10-16 PROCEDURE — 99212 OFFICE O/P EST SF 10 MIN: CPT | Mod: 57

## 2023-10-16 PROCEDURE — 87086 URINE CULTURE/COLONY COUNT: CPT

## 2023-10-16 PROCEDURE — 86850 RBC ANTIBODY SCREEN: CPT

## 2023-10-16 PROCEDURE — 80053 COMPREHEN METABOLIC PANEL: CPT

## 2023-10-16 PROCEDURE — 85730 THROMBOPLASTIN TIME PARTIAL: CPT

## 2023-10-16 PROCEDURE — 99285 EMERGENCY DEPT VISIT HI MDM: CPT | Mod: 25

## 2023-10-16 PROCEDURE — 96374 THER/PROPH/DIAG INJ IV PUSH: CPT

## 2023-10-16 PROCEDURE — 59151 TREAT ECTOPIC PREGNANCY: CPT

## 2023-10-16 PROCEDURE — C9399: CPT

## 2023-10-16 PROCEDURE — 84100 ASSAY OF PHOSPHORUS: CPT

## 2023-10-16 PROCEDURE — 99285 EMERGENCY DEPT VISIT HI MDM: CPT

## 2023-10-16 PROCEDURE — 81001 URINALYSIS AUTO W/SCOPE: CPT

## 2023-10-16 PROCEDURE — 76817 TRANSVAGINAL US OBSTETRIC: CPT

## 2023-10-16 PROCEDURE — 88341 IMHCHEM/IMCYTCHM EA ADD ANTB: CPT

## 2023-10-16 PROCEDURE — 88305 TISSUE EXAM BY PATHOLOGIST: CPT

## 2023-10-16 PROCEDURE — 93975 VASCULAR STUDY: CPT | Mod: 26

## 2023-10-16 PROCEDURE — 93975 VASCULAR STUDY: CPT

## 2023-10-16 PROCEDURE — 86900 BLOOD TYPING SEROLOGIC ABO: CPT

## 2023-10-16 PROCEDURE — 84702 CHORIONIC GONADOTROPIN TEST: CPT

## 2023-10-16 PROCEDURE — 86901 BLOOD TYPING SEROLOGIC RH(D): CPT

## 2023-10-16 PROCEDURE — 76830 TRANSVAGINAL US NON-OB: CPT

## 2023-10-16 PROCEDURE — 85025 COMPLETE CBC W/AUTO DIFF WBC: CPT

## 2023-10-16 PROCEDURE — 88305 TISSUE EXAM BY PATHOLOGIST: CPT | Mod: 26

## 2023-10-16 RX ORDER — IBUPROFEN 200 MG
1 TABLET ORAL
Qty: 0 | Refills: 0 | DISCHARGE

## 2023-10-16 RX ORDER — SODIUM CHLORIDE 9 MG/ML
1000 INJECTION, SOLUTION INTRAVENOUS
Refills: 0 | Status: DISCONTINUED | OUTPATIENT
Start: 2023-10-16 | End: 2023-10-17

## 2023-10-16 RX ORDER — ONDANSETRON 8 MG/1
4 TABLET, FILM COATED ORAL ONCE
Refills: 0 | Status: COMPLETED | OUTPATIENT
Start: 2023-10-16 | End: 2023-10-16

## 2023-10-16 RX ORDER — ACETAMINOPHEN 500 MG
1000 TABLET ORAL ONCE
Refills: 0 | Status: COMPLETED | OUTPATIENT
Start: 2023-10-16 | End: 2023-10-16

## 2023-10-16 RX ORDER — ACETAMINOPHEN 500 MG
3 TABLET ORAL
Qty: 0 | Refills: 0 | DISCHARGE

## 2023-10-16 RX ORDER — HYDROMORPHONE HYDROCHLORIDE 2 MG/ML
0.5 INJECTION INTRAMUSCULAR; INTRAVENOUS; SUBCUTANEOUS
Refills: 0 | Status: DISCONTINUED | OUTPATIENT
Start: 2023-10-16 | End: 2023-10-17

## 2023-10-16 RX ORDER — SODIUM CHLORIDE 9 MG/ML
1000 INJECTION INTRAMUSCULAR; INTRAVENOUS; SUBCUTANEOUS ONCE
Refills: 0 | Status: COMPLETED | OUTPATIENT
Start: 2023-10-16 | End: 2023-10-16

## 2023-10-16 RX ADMIN — SODIUM CHLORIDE 125 MILLILITER(S): 9 INJECTION, SOLUTION INTRAVENOUS at 23:11

## 2023-10-16 RX ADMIN — Medication 400 MILLIGRAM(S): at 15:52

## 2023-10-16 RX ADMIN — SODIUM CHLORIDE 1000 MILLILITER(S): 9 INJECTION INTRAMUSCULAR; INTRAVENOUS; SUBCUTANEOUS at 15:51

## 2023-10-16 RX ADMIN — ONDANSETRON 4 MILLIGRAM(S): 8 TABLET, FILM COATED ORAL at 23:11

## 2023-10-16 NOTE — ED PROVIDER NOTE - ATTENDING CONTRIBUTION TO CARE
38-year-old female, , LMP , presents to ED complaining of right lower quadrant abdominal pain since this morning.  Patient notes has been having intermittent vaginal bleeding since . Pt believed she had miscarried. Was being followed by her OB: Dr. Tavo Mireles. HCG initially downtrended from 772 (on ) to 456 (on 10/06), however now has uptrended a bit to 459 (on 10/15). Pt received Ultrasound in office this morning that showed Rt adnexal mass, concerning for ectopic pregnancy. Given this was sent to ED for r/o ectopic. Pt denies f/c, CP, SOB, n/v, urinary sx, weakness/numbness, lightheadedness/dizziness, syncope, or any other symptoms at this time. Pt states currently vaginal spotting is very light.    VSS. Physical exam significant for well-appearing patient in no acute distress.  Heart is regular rate and rhythm.  Lungs clear to auscultation bilaterally.  Abdomen is soft with bilateral lower abdominal tenderness (>RLQ).  No CVA tenderness.  No lower extremity edema.  Pulses are 2+ throughout.  Normal cap refill.  Neuro exam is nonfocal.  Otherwise unremarkable physical exam.    Patient presenting with high suspicion for ectopic pregnancy.  Currently patient is well-appearing with normal vital signs.  Exam significant for right lower quadrant tenderness.  Outpatient ultrasound showing right adnexal mass, concerning for ectopic pregnancy.  Patient states vaginal bleeding is very light at this time.  Plan to check basic labs, hCG, type and screen, UA, and transvaginal ultrasound.  We will consult OB/GYN for evaluation. Dispo pending results and OB/GYN recs.

## 2023-10-16 NOTE — ED ADULT TRIAGE NOTE - CHIEF COMPLAINT QUOTE
Positive pregnancy test in September, pt had bleeding on 9/25 and believed they miscarried, now experiencing lower abdominal pain. OBGYN wanted pt to come in to rule out ectopic, HCG levels rising.

## 2023-10-16 NOTE — ASU DISCHARGE PLAN (ADULT/PEDIATRIC) - ASU DC SPECIAL INSTRUCTIONSFT
Nothing per vagina for 2 weeks- no douching, tampons, sitz baths, sexual intercourse.  No heavy lifting for 4 weeks. Call your doctor and go to the ER if you experience severe discomfort, chest pain, shortness of breath, fever greater than 100.4, of excessive vaginal bleeding greater than 1 pad/hr for 2 consecutive hours.  Take over the counter and prescribed medications for pain control as directed. Follow up with your doctor in 2 weeks.

## 2023-10-16 NOTE — ED ADULT NURSE NOTE - NSFALLUNIVINTERV_ED_ALL_ED
Bed/Stretcher in lowest position, wheels locked, appropriate side rails in place/Call bell, personal items and telephone in reach/Instruct patient to call for assistance before getting out of bed/chair/stretcher/Non-slip footwear applied when patient is off stretcher/[place] to call system/Physically safe environment - no spills, clutter or unnecessary equipment/Purposeful proactive rounding/Room/bathroom lighting operational, light cord in reach

## 2023-10-16 NOTE — H&P ADULT - NSHPLABSRESULTS_GEN_ALL_CORE
LABS:                        13.2   7.67  )-----------( 256      ( 16 Oct 2023 15:47 )             39.6     10-16    139  |  105  |  10  ----------------------------<  93  4.1   |  25  |  0.59    Ca    9.4      16 Oct 2023 15:47  Phos  3.9     10-16  Mg     2.0     10-16    TPro  6.7  /  Alb  4.3  /  TBili  0.4  /  DBili  x   /  AST  16  /  ALT  19  /  AlkPhos  57  10-16    PT/INR - ( 16 Oct 2023 15:47 )   PT: 11.2 sec;   INR: 1.07 ratio         PTT - ( 16 Oct 2023 15:47 )  PTT:29.2 sec  Urinalysis Basic - ( 16 Oct 2023 15:47 )    Color: x / Appearance: x / SG: x / pH: x  Gluc: 93 mg/dL / Ketone: x  / Bili: x / Urobili: x   Blood: x / Protein: x / Nitrite: x   Leuk Esterase: x / RBC: x / WBC x   Sq Epi: x / Non Sq Epi: x / Bacteria: x        Blood Type: A Positive        RADIOLOGY & ADDITIONAL STUDIES:  < from: US Doppler Pelvis (10.16.23 @ 17:04) >      ACC: [id number] EXAM:  US DPLX PELVIC   ORDERED BY: SHALINI DELAROSA     ACC: [id number] EXAM:  US OB TRANSVAGINAL   ORDERED BY: SHALINI DELAROSA     PROCEDURE DATE:  10/16/2023          INTERPRETATION:  CLINICAL INFORMATION: Evaluate for ectopic pregnancy.   Pelvic pain.  9/29/2023 serum beta hCG was 700. On 10/6/2023, 456. On 10/15/2023, 459.    LMP: 8/6/2023.    COMPARISON: Early obstetrical sonography performed on 9/6/2017.    TECHNIQUE: Transabdominal and transvaginal pelvic sonography was   performed. Color and spectral Doppler was performed to evaluate the   ovaries and ectopic pregnancy.    FINDINGS:  Uterus: Retroverted.6.5 cm x 5.1 cm x 4.7 cm. No intrauterine gestation.  Endometrium: 6 mm.    Right ovary: 3.6 cm x 1.3 cm x 2 cm. Arterial and venous blood flow was   documented. Just adjacent to the right ovary, in the right adnexal region   there is a right-sided ectopic pregnancy. This rounded   echogenic/heterogeneous structure measures 1.7 cm x 1.5 cm x 1.6 cm.   Vascular blood flow is identified. There is no gestational sac.    Left ovary: 3.9 cm x 2.6 cm x 2.1 cm. There is a dominant follicle   measuring 1.9 cm x 1.9 cm. Arterial and venous blood flow is documented.   Within normal limits.    Fluid: Trace within the cul-de-sac.    IMPRESSION: No intrauterine gestation.    Right-sided ectopic pregnancy measuring 1.7 cm x 1.5 cm x 1.6 cm.    Very trace free fluid within the cul-de-sac.    --- End of Report ---            SILVA CASTRO MD; Attending Radiologist  This document has been electronically signed. Oct 16 2023  5:04PM    < end of copied text >

## 2023-10-16 NOTE — ED PROVIDER NOTE - CADM POA CENTRAL LINE
PT IRP Treatment    Primary Rehabilitation Diagnosis: Cardiac weakness  Expected Discharge Date: 08/18/17 (no reconference needed.)  Planned Discharge Destination: Home    SUBJECTIVE: Subjective: Pt is agreeable to session. \"My leg does feel a little better.\" (08/14/17 0730)  Subjective/Objective Comments: Up in recliner following session with alarm activated and all needs met. Second AM session finished at 1200, pt up ambulating to room following session with aide (08/14/17 0730)    OBJECTIVE:  Precautions  Cardiac Precautions: Yes (08/12/17 0830)  Other Precautions: sternal precautions (08/11/17 1400)  Precautions Comments: Fall risk due to weakness (08/11/17 1400)    See below for current functional status overview.  See PT flowsheet for full details regarding the PT therapy provided.    ASSESSMENT:   Today's AM session was focused on ambulation, standing balance and LE strength. PM session focused on standing balance in SLS and weight acceptance through BLE. Pt made good progress as demonstrated by decreasing RLE pain and improving activity tolerance and balance. Pt will continue to benefit from skilled PT to target maximizing functional independence.      PT Identified Barriers to Discharge: balance deficits, cognitive deficits, weakness, impaired activity tolerance     EDUCATION:   On this date, education was provided to patient regarding  transfers and ambulation  The response to education was/were: Needs reinforcement    PLAN:   Continue skilled PT, including the following Treatment/Interventions: Functional transfer training;Strengthening;Endurance training;Bed mobility;Gait training;Stairs retraining;Safety Education;Neuromuscular re-education (08/14/17 0730)   PT Frequency: 7 days/week (08/14/17 0730), Frequency Comments: 90 min/day at least 5 days/week (08/14/17 0730)    Treatment Plan for Next Session: ambulation with WW, RLE strength and endurance as tolerated, standing balance, safety with WW,  pacing  Additional Plan Considerations: FIM through 8/12       RECOMMENDATIONS FOR DISCHARGE:  Recommendation for Discharge: PT: Intensive therapy program    PT/OT Mobility Equipment for Discharge: monitor, owns cane (08/14/17 0730)  PT/OT ADL Equipment for Discharge: Will continue to assess. (08/13/17 1300)      FUNCTIONAL DATA OVERVIEW LAST 24 HOURS  Bed Mobility        Transfers  Transfers  Sit to Stand: Supervision (Supv) (08/14/17 0730)  Stand to Sit: Supervision (Supv) (08/14/17 0730)  Assistive Device/: 2-wheeled walker;1 Person;Gait Belt (08/14/17 0730)  Transfer Comments 1: close sup for safety, improved eccentric control and appropriate hand placement (08/14/17 0730)    Gait  Gait  Gait Assistance: Supervision (Supv) (08/14/17 0730)  Assistive Device/: 2-wheeled walker;1 Person;Gait Belt (08/14/17 0730)  Ambulation Distance (Feet): 350 Feet (08/14/17 0730)  Pattern: Shuffle;Flexed knee R (08/14/17 0730)  Ambulation Surface: Tile (08/14/17 0730)  Gait Comments 1: slow prince, decreasing antalgia with ambulation (08/14/17 0730),      Stairs       Wheelchair Mobility       Balance       Neuromuscular Re-education  Neuromuscular Re-education  Neuromuscular Re-education 1: standing without UE support: calf/toe raises, lateral lunges. standing in stride stance with head turns, shoulder abduction and PNF patterns (08/14/17 0730)     No

## 2023-10-16 NOTE — BRIEF OPERATIVE NOTE - OPERATION/FINDINGS
LSC survey: atraumatic entry, normal liver edge and gallbladder, normal appearing uterus, L fallopian tubes and bilateral ovaries. Approximately 10cc of blood in pelvis. R fallopian tube with ectopic visualized, approximately 2cm in size.

## 2023-10-16 NOTE — ED PROVIDER NOTE - CLINICAL SUMMARY MEDICAL DECISION MAKING FREE TEXT BOX
This is a 38 year old female, , LMP , presenting with RLQ abdominal pain that started this morning in setting of light vaginal bleeding and suspicion for ectopic pregnancy. Vitals wnl. 110s/70s with MAP of 80s. RLQ ttp + however patient comfortable appearing. Will obtain labs and transvaginal ultrasound. Will contact gynecology for consult for methotrexate vs D&C.

## 2023-10-16 NOTE — ASU DISCHARGE PLAN (ADULT/PEDIATRIC) - CARE PROVIDER_API CALL
Tavo Mireles  Obstetrics and Gynecology  [address] [place] [address], [facility] [address]  [place] [address]  Phone: [phone]  Fax: [phone]  Follow Up Time: 2 weeks

## 2023-10-16 NOTE — ED ADULT NURSE NOTE - OBJECTIVE STATEMENT
39 y/o F with no pertinent medical hx  with c/o of vaginal bleeding and abdominal cramping. Pt states LMP was 23 and had a positive pregnancy test on 23 . on 23 pt started b to have vaginal bleeding that later stopped on its own and started bleeding again today. pt states she was at her  OBGYN office today for an ultrasound and was referred here by OB . pt is currently not bleeding heavily and in no signs of distress. pt is A&Ox4 able to follow all commands. Pulse, motor, sensation present and equal in all 4 extremities. pt Denies HA, dizziness, blurry vision. Respirations spontaneous and unlabored. Denies SOB, dyspnea, cough, CP, palpitations. Denies V/D/C. Abd soft nondistended. Denies urinary symptoms. Denies fever, chills. No sick contacts. Skin intact, warm, dry, normal for race.

## 2023-10-16 NOTE — H&P ADULT - HISTORY OF PRESENT ILLNESS
GYN Consult Note    HPI:  38y  LMP 8 @10w1d by LMP presents with right abdominal pain and vaginal spotting. Patient found out she was pregnant in early september, thought she had a miscarriage due to heavy vaginal bleeding in late september. Per ED H&P " She has been following with her OB Dr. Tavo Mireles with hcg that is consistently elevated. on  was 772, on 10/06 was 456, on 10/15 was 459. Had ultrasound this morning showing R adnexal mass concern for ectopic. Was sent in by him for further eval, repeat imaging, and ob consult."    Currently, pt reports pain improved with Tylenol, now moderate. Still with vaginal spotting. Denies fevers, chills, N/V, syncope, fatigue, palpitations     Name of GYN Physician: Chi    ObHx:  FT x4    GynHx: Denies     PMHx: none  PSHx: spinal fusion L5 S1  Meds: none  All: NKDA  FH: No h/o bleeding or clotting disorders  SH:  Denies etoh, cigarette smoking, recreational drugs.

## 2023-10-16 NOTE — ED PROVIDER NOTE - OBJECTIVE STATEMENT
This is a 38 year old female, , LMP , presenting with RLQ abdominal pain that started this morning in setting of light vaginal bleeding and suspicion for ectopic pregnancy. Reports that she has been bleeding vaginally since 23 and thought she had miscarried. She has been following with her OB Dr. Tavo Mireles with hcg that is consistently elevated. on  was 772, on 10/06 was 456, on 10/15 was 459. Had ultrasound this morning showing R adnexal mass concern for ectopic. Was sent in by him for further eval, repeat imaging, and ob consult. Denies any shortness of breath, nausea/vomiting, passing out, or urinary sxs at this time. Reports vaginal bleeding was light and stopped after a few minutes. Unsure how much saturation she got through her pad.

## 2023-10-16 NOTE — ASU DISCHARGE PLAN (ADULT/PEDIATRIC) - MEDICATION INSTRUCTIONS
take tylenol and motrin as needed for pain. You may use prescribed oxycodone for breakthrough pain. Please note this medication may make you constipated. You can use over the counter stool softener like Miralax if needed.

## 2023-10-16 NOTE — H&P ADULT - ASSESSMENT
A/P: 38y  LMP 8/6 @10w1d by LMP presents with right abdominal pain and vaginal spotting with ultrasound concerning for right ectopic pregnancy with tenderness c/f impending rupture though at this time patient stable with benign exam and VSS.    - Admit to GYN   - RH+, please send confirmatory T&S  - Add on for emergent dx lsc   - NPO since yesterday     Lorelei Daniel, PGY-4    D/W Dr Underwood

## 2023-10-16 NOTE — H&P ADULT - NSHPPHYSICALEXAM_GEN_ALL_CORE
Vital Signs Last 24 Hrs  T(C): 36.8 (16 Oct 2023 15:06), Max: 37.2 (16 Oct 2023 14:20)  T(F): 98.2 (16 Oct 2023 15:06), Max: 99 (16 Oct 2023 14:20)  HR: 86 (16 Oct 2023 15:06) (79 - 86)  BP: 107/59 (16 Oct 2023 15:06) (99/68 - 107/59)  BP(mean): --  RR: 16 (16 Oct 2023 15:06) (16 - 16)  SpO2: 100% (16 Oct 2023 15:06) (99% - 100%)    Parameters below as of 16 Oct 2023 15:06  Patient On (Oxygen Delivery Method): room air    PHYSICAL EXAM:   Gen: Comfortable, NAD  Psych: appropriate mood & affect  CV: RRR  Pulm: CTAB  Abd: Soft, NT, ND, no rebound, no guarding  Ext: Warm & well perfused.   Pelvic: Focal tenderness over right adnexa with scant VB, no CMT, otherwise grossly normal

## 2023-10-16 NOTE — ED PROVIDER NOTE - PHYSICAL EXAMINATION
General: NAD  HEENT: NCAT. Non erythematous, non swollen tonsils. No exudates.  Cardiac: RRR, 2+ radial pulses  Chest: CTA  Abdomen: soft, non-distended, + RLQ ttp, no rebound or guarding  Extremities: no peripheral edema, calf tenderness, or leg size discrepancies  Skin: no rashes  Neuro: AAOx3, motor and sensory grossly intact.   Psych: mood and affect appropriate

## 2023-10-17 VITALS
RESPIRATION RATE: 16 BRPM | OXYGEN SATURATION: 99 % | TEMPERATURE: 97 F | DIASTOLIC BLOOD PRESSURE: 53 MMHG | SYSTOLIC BLOOD PRESSURE: 105 MMHG | HEART RATE: 86 BPM

## 2023-10-17 LAB
CULTURE RESULTS: SIGNIFICANT CHANGE UP
CULTURE RESULTS: SIGNIFICANT CHANGE UP
SPECIMEN SOURCE: SIGNIFICANT CHANGE UP
SPECIMEN SOURCE: SIGNIFICANT CHANGE UP

## 2023-10-17 RX ORDER — OXYCODONE HYDROCHLORIDE 5 MG/1
1 TABLET ORAL
Qty: 4 | Refills: 0
Start: 2023-10-17

## 2023-10-17 NOTE — CHART NOTE - NSCHARTNOTEFT_GEN_A_CORE
R4 GYN POST-OP CHECK NOTE    SUBJECTIVE:    37yo F now POD0 s/p LSC Salpingectomy for ectopic pregnancy    Pt reports pain well controlled by pain meds.  Ambulating. Voiding. She is tolerating sips of clear liquids and crackers. Mild nausea. No emesis. She denies fever, chills, vomiting, headache, dizziness, chest pain, palpitations, shortness of breath.    HYDROmorphone  Injectable 0.5 milliGRAM(s) IV Push every 10 minutes PRN  lactated ringers. 1000 milliLiter(s) IV Continuous <Continuous>  lactated ringers. 1000 milliLiter(s) IV Continuous <Continuous>      OBJECTIVE:    VITAL SIGNS:  Vital Signs Last 24 Hrs  T(C): 36.2 (16 Oct 2023 23:45), Max: 37.2 (16 Oct 2023 14:20)  T(F): 97.2 (16 Oct 2023 23:45), Max: 99 (16 Oct 2023 14:20)  HR: 87 (17 Oct 2023 00:08) (77 - 100)  BP: 101/55 (16 Oct 2023 23:45) (99/68 - 112/59)  BP(mean): 70 (16 Oct 2023 23:45) (70 - 80)  RR: 17 (17 Oct 2023 00:08) (14 - 17)  SpO2: 99% (17 Oct 2023 00:08) (92% - 100%)    Parameters below as of 17 Oct 2023 00:08  Patient On (Oxygen Delivery Method): room air      CAPILLARY BLOOD GLUCOSE          10-16-23 @ 07:01  -  10-17-23 @ 00:29  --------------------------------------------------------  IN: 520 mL / OUT: 500 mL / NET: 20 mL        PHYSICAL EXAM:  GEN: NAD, A&Ox3  CV: RRR, no m/g/r  LUNGS: CTAB  ABD: soft, appropriately tender, ND, +BS  Incision: CDI, dressings in place  EXT: WWP, no edema/TTP    LABS:    CBC: 10-16-23 @ 15:47          13.2  7.67 )-------( 256          39.6        BMP: 10-16-23 @ 15:47  139|105|10  ------------------<93  4.1|25|0.59    AST/ALT: 16/19      ASSESSMENT:  37yo F now POD0 s/p LSC Salpingectomy for ectopic pregnancy  Patient is stable and progressing normally postoperatively.    NEURO: pain well controlled on current regimen  CV: hemodyamically stable  PULM: increase incentive spirometry  GI: advance diet as tolerated; colace/mylicon prn  : voiding  HEME: early ambulation  ID: afebrile  Dispo: cleared for discharge    [facility] PGY4

## 2023-10-20 NOTE — ED PROVIDER NOTE - PMH
PreOp     Katalina is here for preop visit.  Will proceed with laparoscopic bilateral salpingectomy, left ovarian cystectomy for dermoid cyst  PAST MEDICAL, FAMILY AND SOCIAL HISTORY     The following histories were personally reviewed and updated.  Current medications: none   Allergies: cinnomen  Past Medical History: None   Past Surgical History: none      PHYSICAL EXAM     Physical Exam   Physical Exam  Constitutional:       Appearance: She is normal weight.   Genitourinary:      Bladder and urethral meatus normal.      Right Labia: No rash, tenderness or lesions.     Left Labia: No tenderness, lesions or rash.     No vaginal discharge, erythema or bleeding.      No vaginal prolapse present.     No vaginal atrophy present.       Right Adnexa: not tender, not full and no mass present.     Left Adnexa: tender and mass present.     Cervix is nulliparous.      Uterus is not enlarged or tender.      Pelvic Floor: Levator muscle strength is 4/5.    Neurological:      Mental Status: She is alert.   Vitals reviewed.         ASSESSMENT/PLAN     7cms Dermoid cyst on the left, undesired fertility, to proceed with laparoscopic bilateral salpingectomy left ovarian cystectomy.  All risks of procedure discussed with patient including infection, injury to vessels and organs near the surgical site, bleeding and need to open, for complications   No pertinent past medical history

## 2023-10-23 LAB
SURGICAL PATHOLOGY STUDY: SIGNIFICANT CHANGE UP
SURGICAL PATHOLOGY STUDY: SIGNIFICANT CHANGE UP

## 2023-10-31 ENCOUNTER — APPOINTMENT (OUTPATIENT)
Dept: OBGYN | Facility: CLINIC | Age: 38
End: 2023-10-31
Payer: COMMERCIAL

## 2023-10-31 PROCEDURE — 99024 POSTOP FOLLOW-UP VISIT: CPT

## 2024-01-05 ENCOUNTER — APPOINTMENT (OUTPATIENT)
Dept: OBGYN | Facility: CLINIC | Age: 39
End: 2024-01-05
Payer: COMMERCIAL

## 2024-01-05 PROCEDURE — 76830 TRANSVAGINAL US NON-OB: CPT

## 2024-01-05 PROCEDURE — 58300 INSERT INTRAUTERINE DEVICE: CPT

## 2024-02-06 ENCOUNTER — APPOINTMENT (OUTPATIENT)
Dept: OBGYN | Facility: CLINIC | Age: 39
End: 2024-02-06
Payer: COMMERCIAL

## 2024-02-06 PROCEDURE — 76830 TRANSVAGINAL US NON-OB: CPT

## 2024-02-06 PROCEDURE — 99212 OFFICE O/P EST SF 10 MIN: CPT | Mod: 25

## 2025-06-05 ENCOUNTER — APPOINTMENT (OUTPATIENT)
Dept: OBGYN | Facility: CLINIC | Age: 40
End: 2025-06-05
Payer: COMMERCIAL

## 2025-06-05 PROCEDURE — 99459 PELVIC EXAMINATION: CPT | Mod: NC

## 2025-06-05 PROCEDURE — 96127 BRIEF EMOTIONAL/BEHAV ASSMT: CPT

## 2025-06-05 PROCEDURE — 99396 PREV VISIT EST AGE 40-64: CPT

## (undated) DEVICE — INSUFFLATION NDL COVIDIEN STEP 14G FOR STEP/VERSASTEP

## (undated) DEVICE — TROCAR COVIDIEN VERSAONE FIXATION CANNULA 5MM

## (undated) DEVICE — Device

## (undated) DEVICE — NDL HYPO SAFE 22G X 1.5" (BLACK)

## (undated) DEVICE — MARKING PEN W RULER

## (undated) DEVICE — TUBING STRYKEFLOW II SUCTION / IRRIGATOR

## (undated) DEVICE — D HELP - CLEARVIEW CLEARIFY SYSTEM

## (undated) DEVICE — STAPLER COVIDIEN ENDO GIA STANDARD HANDLE

## (undated) DEVICE — PACK GYN LAPAROSCOPY

## (undated) DEVICE — FOLEY TRAY 16FR 5CC LTX UMETER CLOSED

## (undated) DEVICE — DRAPE 3/4 SHEET W REINFORCEMENT 56X77"

## (undated) DEVICE — DRAPE GENERAL ENDOSCOPY

## (undated) DEVICE — TROCAR COVIDIEN VERSAONE BLADED FIXATION 11MM STANDARD

## (undated) DEVICE — PREP CHLORAPREP HI-LITE ORANGE 26ML

## (undated) DEVICE — STAPLER SKIN VISI-STAT 35 WIDE

## (undated) DEVICE — DRAPE TOWEL BLUE 17" X 24"

## (undated) DEVICE — ENDOCATCH 10MM SPECIMEN POUCH

## (undated) DEVICE — TUBING SUCTION 20FT

## (undated) DEVICE — PREP BETADINE SPONGE STICKS

## (undated) DEVICE — DRSG MASTISOL

## (undated) DEVICE — DRSG OPSITE 2.5 X 2"

## (undated) DEVICE — SUT BIOSYN 4-0 18" P-12

## (undated) DEVICE — LAPSAC SURGICAL TISSUE POUCH 2X5"

## (undated) DEVICE — DRSG DERMABOND 0.7ML

## (undated) DEVICE — NDL HYPO SAFE 18G X 1.5" (PINK)

## (undated) DEVICE — SYR LUER LOK 10CC

## (undated) DEVICE — DRSG STERISTRIPS 0.5 X 4"

## (undated) DEVICE — DRAPE LIGHT HANDLE COVER (BLUE)

## (undated) DEVICE — FOLEY TRAY 16FR LF URINE METER SURESTEP

## (undated) DEVICE — VENODYNE/SCD SLEEVE CALF LARGE

## (undated) DEVICE — TUBING TUR 2 PRONG

## (undated) DEVICE — TROCAR APPLIED MEDICAL KII BALLOON BLUNT TIP 12MM X 100MM

## (undated) DEVICE — GRASPER COVIDIEN ENDO GRASP ROTICULATOR 5MM W SPIN LOCK

## (undated) DEVICE — BLADE SCALPEL SAFETYLOCK #15

## (undated) DEVICE — UTERINE MANIPULATOR CONMED VCARE SM 32MM

## (undated) DEVICE — ENDOCATCH II 15MM

## (undated) DEVICE — TROCAR COVIDIEN STEP 5MM SHORT 70MM

## (undated) DEVICE — UTERINE MANIPULATOR CONMED VCARE MED 34MM

## (undated) DEVICE — DRAPE INSTRUMENT POUCH 6.75" X 11"

## (undated) DEVICE — SOL IRR POUR H2O 250ML

## (undated) DEVICE — LAP PAD 18 X 18"

## (undated) DEVICE — TUBING INSUFFLATION LAP FILTER 10FT

## (undated) DEVICE — DISSECTOR COVIDIEN ROTICULATOR 5MM W MONOPOLAR CAUTERY

## (undated) DEVICE — TROCAR COVIDIEN VERSASTEP PLUS 11MM STANDARD

## (undated) DEVICE — BLADE SCALPEL SAFETYLOCK #10

## (undated) DEVICE — LAPSAC SURGICAL TISSUE POUCH 8X5"

## (undated) DEVICE — DISSECTOR ENDO PEANUT 5MM

## (undated) DEVICE — SCOPE WARMER SEAL DISP

## (undated) DEVICE — DRAPE MAYO STAND 30"

## (undated) DEVICE — UTERINE MANIPULATOR CLINICAL INNOVATIONS CLEARVIEW 7CM

## (undated) DEVICE — LIGASURE BLUNT TIP 37CM

## (undated) DEVICE — MEDICATION LABELS W MARKER

## (undated) DEVICE — WARMING BLANKET UPPER ADULT

## (undated) DEVICE — DRSG TELFA 3 X 8

## (undated) DEVICE — TROCAR COVIDIEN BLUNT TIP HASSAN 10MM

## (undated) DEVICE — DRAPE 1/2 SHEET 40X57"

## (undated) DEVICE — ELCTR DISSECTOR ULTRASONIC CORDLESS CVD JAW 5MM-39CM

## (undated) DEVICE — PREP BETADINE KIT

## (undated) DEVICE — VISITEC 4X4

## (undated) DEVICE — LIGASURE ATLAS 10MM 37CM

## (undated) DEVICE — NDL COUNTER FOAM AND MAGNET 40-70

## (undated) DEVICE — POSITIONER FOAM EGG CRATE ULNAR 2PCS (PINK)

## (undated) DEVICE — VALVE YELLOW PORT SEAL PLUS 5MM

## (undated) DEVICE — SOL IRR POUR NS 0.9% 500ML

## (undated) DEVICE — TROCAR COVIDIEN VERSASTEP SLEEVE

## (undated) DEVICE — TROCAR GELPOINT MINI ADVANCED

## (undated) DEVICE — LIGASURE MARYLAND 37CM

## (undated) DEVICE — SPECIMEN CONTAINER 100ML

## (undated) DEVICE — GOWN TRIMAX LG

## (undated) DEVICE — SUT POLYSORB 0 30" GS-23

## (undated) DEVICE — UTERINE MANIPULATOR CONMED VCARE LG 37MM

## (undated) DEVICE — UTERINE MANIPULATOR COOPER SURGICAL 5MM 33CM GREEN